# Patient Record
Sex: MALE | URBAN - METROPOLITAN AREA
[De-identification: names, ages, dates, MRNs, and addresses within clinical notes are randomized per-mention and may not be internally consistent; named-entity substitution may affect disease eponyms.]

---

## 2019-01-12 ENCOUNTER — HOSPITAL ENCOUNTER (OUTPATIENT)
Dept: CT IMAGING | Facility: HOSPITAL | Age: 49
Discharge: HOME | End: 2019-01-12
Attending: FAMILY MEDICINE

## 2019-01-12 DIAGNOSIS — Z13.6 SCREENING FOR CARDIOVASCULAR CONDITION: ICD-10-CM

## 2019-01-16 ENCOUNTER — TELEPHONE (OUTPATIENT)
Dept: FAMILY MEDICINE CLINIC | Facility: CLINIC | Age: 49
End: 2019-01-16

## 2019-01-16 NOTE — TELEPHONE ENCOUNTER
Please call pt to make appt       Marine Marc Noble, DO  [address]             Please call patient to establish care.  Thanks.      Dr. Luzma Ramirez    Previous Messages      ----- Message -----   From: Faith Joseph   Sent: 1/14/2019   1:25 PM